# Patient Record
Sex: FEMALE | Race: WHITE | NOT HISPANIC OR LATINO | URBAN - METROPOLITAN AREA
[De-identification: names, ages, dates, MRNs, and addresses within clinical notes are randomized per-mention and may not be internally consistent; named-entity substitution may affect disease eponyms.]

---

## 2024-07-15 ENCOUNTER — OFFICE VISIT (OUTPATIENT)
Dept: OTOLARYNGOLOGY | Facility: CLINIC | Age: 64
End: 2024-07-15
Payer: MEDICAID

## 2024-07-15 VITALS — TEMPERATURE: 98 F | DIASTOLIC BLOOD PRESSURE: 78 MMHG | HEART RATE: 69 BPM | SYSTOLIC BLOOD PRESSURE: 120 MMHG

## 2024-07-15 DIAGNOSIS — H91.93 BILATERAL HEARING LOSS, UNSPECIFIED HEARING LOSS TYPE: ICD-10-CM

## 2024-07-15 DIAGNOSIS — H61.23 BILATERAL IMPACTED CERUMEN: ICD-10-CM

## 2024-07-15 DIAGNOSIS — H93.13 TINNITUS OF BOTH EARS: Primary | ICD-10-CM

## 2024-07-15 PROCEDURE — 99214 OFFICE O/P EST MOD 30 MIN: CPT | Mod: PBBFAC | Performed by: NURSE PRACTITIONER

## 2024-07-15 PROCEDURE — 69210 REMOVE IMPACTED EAR WAX UNI: CPT | Mod: 50,PBBFAC | Performed by: NURSE PRACTITIONER

## 2024-07-15 PROCEDURE — 69210 REMOVE IMPACTED EAR WAX UNI: CPT | Mod: 50,S$PBB,, | Performed by: NURSE PRACTITIONER

## 2024-07-15 PROCEDURE — 99203 OFFICE O/P NEW LOW 30 MIN: CPT | Mod: S$PBB,,, | Performed by: NURSE PRACTITIONER

## 2024-07-15 RX ORDER — DIAZEPAM 2 MG/1
2 TABLET ORAL EVERY 6 HOURS PRN
COMMUNITY

## 2024-07-15 RX ORDER — FAMOTIDINE 20 MG/1
20 TABLET, FILM COATED ORAL 2 TIMES DAILY
COMMUNITY

## 2024-07-15 RX ORDER — TOPIRAMATE 25 MG/1
25 TABLET ORAL NIGHTLY
COMMUNITY

## 2024-07-15 RX ORDER — PRAVASTATIN SODIUM 40 MG/1
40 TABLET ORAL DAILY
COMMUNITY

## 2024-07-15 RX ORDER — ALBUTEROL SULFATE 90 UG/1
2 AEROSOL, METERED RESPIRATORY (INHALATION) EVERY 6 HOURS PRN
COMMUNITY

## 2024-07-15 RX ORDER — IBUPROFEN 800 MG/1
800 TABLET ORAL EVERY 6 HOURS PRN
COMMUNITY

## 2024-07-15 RX ORDER — LEVOCETIRIZINE DIHYDROCHLORIDE 5 MG/1
5 TABLET, FILM COATED ORAL NIGHTLY
COMMUNITY

## 2024-07-15 RX ORDER — BUPROPION HYDROCHLORIDE 150 MG/1
150 TABLET ORAL DAILY
COMMUNITY

## 2024-07-15 RX ORDER — ESTRADIOL 2 MG/1
2 TABLET ORAL DAILY
COMMUNITY

## 2024-07-15 RX ORDER — QUETIAPINE FUMARATE 50 MG/1
50 TABLET, FILM COATED ORAL NIGHTLY
COMMUNITY

## 2024-07-15 RX ORDER — CETIRIZINE HYDROCHLORIDE 10 MG/1
10 TABLET ORAL DAILY
COMMUNITY

## 2024-07-15 RX ORDER — MONTELUKAST SODIUM 4 MG/1
1 TABLET, CHEWABLE ORAL DAILY
COMMUNITY

## 2024-07-15 RX ORDER — OMEPRAZOLE 20 MG/1
20 CAPSULE, DELAYED RELEASE ORAL DAILY
COMMUNITY

## 2024-07-15 NOTE — PROGRESS NOTES
[facility]  Otolaryngology Clinic Note    Sivan Ayala  YOB: 1960    Chief Complaint:   Chief Complaint   Patient presents with    referral: Tinnitus        HPI: 07/15/2024: 63 y.o. female referred for tinnitus. 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 Hx of recurrent ear infections throughout her life. She has had 2 sets of PE tubes, around age 22 and again about 3 years ago per Dr. Maki. Endorses infrequent otalgia. Denies loud noise exposure or family hx of HL. States she began having trouble hearing as a child. No hx of amplification.     ROS:   10-point review of systems negative except per HPI      Review of patient's allergies indicates:   Allergen Reactions    Codeine Nausea And Vomiting       History reviewed. No pertinent past medical history.    Past Surgical History:   Procedure Laterality Date    HYSTERECTOMY         Social History     Socioeconomic History    Marital status:    Tobacco Use    Smoking status: Never     Passive exposure: Never    Smokeless tobacco: Never   Social History Narrative    ** Merged History Encounter **            No family history on file.    Outpatient Encounter Medications as of 7/15/2024   Medication Sig Dispense Refill    albuterol (PROVENTIL/VENTOLIN HFA) 90 mcg/actuation inhaler Inhale 2 puffs into the lungs every 6 (six) hours as needed for Wheezing. Rescue      buPROPion (WELLBUTRIN XL) 150 MG TB24 tablet Take 150 mg by mouth once daily.      cetirizine (ZYRTEC) 10 MG tablet Take 10 mg by mouth once daily.      diazePAM (VALIUM) 2 MG tablet Take 2 mg by mouth every 6 (six) hours as needed for Anxiety.      estradioL (ESTRACE) 2 MG tablet Take 2 mg by mouth once daily.      famotidine (PEPCID) 20 MG tablet Take 20 mg by mouth 2 (two) times daily.      ibuprofen (ADVIL,MOTRIN) 800 MG tablet Take 800 mg by mouth every 6 (six) hours as needed for Pain.       levocetirizine (XYZAL) 5 MG tablet Take 5 mg by mouth every evening.      omeprazole (PRILOSEC) 20 MG capsule Take 20 mg by mouth once daily.      pravastatin (PRAVACHOL) 40 MG tablet Take 40 mg by mouth once daily.      QUEtiapine (SEROQUEL) 50 MG tablet Take 50 mg by mouth every evening.       No facility-administered encounter medications on file as of 7/15/2024.       Physical Exam:  Vitals:    07/15/24 1051   BP: 120/78   BP Location: Right arm   Patient Position: Sitting   BP Method: Large (Automatic)   Pulse: 69   Temp: 98.2 °F (36.8 °C)   TempSrc: Oral       Physical Exam   General: NAD, voice normal  Neuro: AAO, CN II - XII grossly intact  Head/ Face: NCAT, symmetric, sensations intact bilaterally  Eyes: EOMI, PERRL  Ears: externally normal with grossly normal hearing. Able to auto insufflate b/l. Ureña lateralizes right. No mastoid tenderness  AD: EAC with cerumen impaction- atraumatic removal under microscopy with suction- there is a small amt of squamous debris and cerumen adherent to T-tube which appears to be in place in TM- no otorrhea or retraction  AS: EAC with obstructive cerumen- atraumatic removal under microscopy with suction- there is some hard cerumen adherent to T-tube which appears to be in place in TM, possibly partially extruded- no otorrhea or retraction  Nose: bilateral nares patent, left septal deviation, no rhinorrhea, left deviation of nasal dorsum with right tip deflection, no turbinate hypertrophy  OC/OP: MMM, no intraoral lesions, no trismus, dentition is moderate, no uvular deviation, bilaterally symmetric soft palate elevation, palatoglossus and palatopharyngeal fold wnl; tonsils are symmetric and 1+  Indirect laryngoscopy: deferred due to patient intolerance  Neck: soft, supple, no LAD, normal ROM, no thyromegaly  Respiratory: nonlabored, no wheezing, bilateral chest rise  Cardiovascular: RRR  Gastrointestinal: S NT ND  Skin: warm, no lesions  Musculoskeletal: 5/5  strength  Psych: Appropriate affect/mood     Pertinent Data:  ? LABS:  ? AUDIO:           ? PATH:      Imaging:   I personally reviewed the following images:        Assessment/Plan:  63 y.o. female with hx of PE tubes, most recently 3 placed years ago. Also with lonstanding hx of L>R HL. B/l cerumen removal today.  - Debrox BID  - Audio  - RTC 6-8 wks    Lyric Vilchis NP

## 2024-10-30 ENCOUNTER — OFFICE VISIT (OUTPATIENT)
Dept: OTOLARYNGOLOGY | Facility: CLINIC | Age: 64
End: 2024-10-30
Payer: MEDICAID

## 2024-10-30 ENCOUNTER — CLINICAL SUPPORT (OUTPATIENT)
Dept: AUDIOLOGY | Facility: HOSPITAL | Age: 64
End: 2024-10-30
Payer: MEDICAID

## 2024-10-30 VITALS — SYSTOLIC BLOOD PRESSURE: 138 MMHG | HEART RATE: 67 BPM | DIASTOLIC BLOOD PRESSURE: 80 MMHG | TEMPERATURE: 98 F

## 2024-10-30 DIAGNOSIS — H90.3 SENSORINEURAL HEARING LOSS (SNHL) OF BOTH EARS: Primary | ICD-10-CM

## 2024-10-30 DIAGNOSIS — H91.93 BILATERAL HEARING LOSS, UNSPECIFIED HEARING LOSS TYPE: ICD-10-CM

## 2024-10-30 DIAGNOSIS — Z96.22 PATENT PRESSURE EQUALIZATION (PE) TUBE, RIGHT: ICD-10-CM

## 2024-10-30 DIAGNOSIS — H93.13 TINNITUS OF BOTH EARS: ICD-10-CM

## 2024-10-30 DIAGNOSIS — H90.3 SENSORINEURAL HEARING LOSS, BILATERAL: Primary | ICD-10-CM

## 2024-10-30 PROCEDURE — 92557 COMPREHENSIVE HEARING TEST: CPT | Performed by: AUDIOLOGIST

## 2024-10-30 PROCEDURE — 69200 CLEAR OUTER EAR CANAL: CPT | Mod: PBBFAC,LT | Performed by: NURSE PRACTITIONER

## 2024-10-30 PROCEDURE — 92567 TYMPANOMETRY: CPT | Performed by: AUDIOLOGIST

## 2024-10-30 PROCEDURE — 99214 OFFICE O/P EST MOD 30 MIN: CPT | Mod: PBBFAC,25 | Performed by: NURSE PRACTITIONER

## 2024-10-30 RX ORDER — OXYCODONE AND ACETAMINOPHEN 5; 325 MG/1; MG/1
1 TABLET ORAL EVERY 8 HOURS
COMMUNITY
Start: 2024-07-11

## 2024-10-30 RX ORDER — SUCRALFATE 1 G/1
1 TABLET ORAL 3 TIMES DAILY
COMMUNITY
Start: 2024-10-07

## 2024-10-30 RX ORDER — OFLOXACIN 3 MG/ML
SOLUTION AURICULAR (OTIC)
COMMUNITY
Start: 2024-08-09

## 2024-10-30 RX ORDER — LAMOTRIGINE 25 MG/1
TABLET ORAL
COMMUNITY

## 2024-10-30 RX ORDER — DEXTROAMPHETAMINE SACCHARATE, AMPHETAMINE ASPARTATE, DEXTROAMPHETAMINE SULFATE, AND AMPHETAMINE SULFATE 1.25; 1.25; 1.25; 1.25 MG/1; MG/1; MG/1; MG/1
TABLET ORAL
COMMUNITY
Start: 2024-10-09

## 2024-10-30 RX ORDER — NEOMYCIN SULFATE, POLYMYXIN B SULFATE AND HYDROCORTISONE 10; 3.5; 1 MG/ML; MG/ML; [USP'U]/ML
SUSPENSION/ DROPS AURICULAR (OTIC)
COMMUNITY
Start: 2024-08-24

## 2024-10-30 RX ORDER — HYDROXYZINE PAMOATE 25 MG/1
25 CAPSULE ORAL
COMMUNITY
Start: 2024-05-22

## 2024-11-12 ENCOUNTER — HOSPITAL ENCOUNTER (OUTPATIENT)
Dept: RADIOLOGY | Facility: HOSPITAL | Age: 64
Discharge: HOME | End: 2024-11-12
Attending: NURSE PRACTITIONER
Payer: MEDICAID

## 2024-11-12 DIAGNOSIS — H90.3 SENSORINEURAL HEARING LOSS (SNHL) OF BOTH EARS: ICD-10-CM

## 2024-11-12 PROCEDURE — 70480 CT ORBIT/EAR/FOSSA W/O DYE: CPT | Mod: TC

## 2025-01-07 NOTE — PROGRESS NOTES
Audio Only Telehealth Visit     The patient location is: [facility]  The chief complaint leading to consultation is: f/u  Visit type: Virtual visit with audio only (telephone)  Total time spent on visit: 22 min     The reason for the audio only service rather than synchronous audio and video virtual visit was related to technical difficulties or patient preference/necessity.     Each patient to whom I provide medical services by telemedicine is:  (1) informed of the relationship between the physician and patient and the respective role of any other health care provider with respect to management of the patient; and (2) notified that they may decline to receive medical services by telemedicine and may withdraw from such care at any time. Patient verbally consented to receive this service via voice-only telephone call.       HPI:  07/15/2024: 63 y.o. female referred for tinnitus. 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 Hx of recurrent ear infections throughout her life. She has had 2 sets of PE tubes, around age 22 and again about 3 years ago per Dr. Maki. Endorses infrequent otalgia. Denies loud noise exposure or family hx of HL. States she began having trouble hearing as a child. No hx of amplification.      10/30/2024: F/u after audio. States she had a bad ear infection following last visit for which she took 2 rounds of oral abx as well as gtts. States right ear was hurting and draining pus. No c/o today.     01/08/25: F/u after CT IAC. Denies change in symptoms.    Audio:       Imaging:          Assessment and plan:  64 y.o. female with hx of PE tubes, most recently 3 placed years ago. Audio with b/l SNHL, R>L. Loss is not >15dB across 3 consecutive frequencies, however, near asymmetric. CT temporal bones essentially unremarkable- mild b/l mastoid opacification & thickened right TM- no surgical intervention indicated. D/w Dr. Ryan.  - 6mo audio  -  Medically cleared for amplification  - Dry ear precautions  - RTC 6mo, office or telemed    Lyric Vilchis NP        This service was not originating from a related E/M service provided within the previous 7 days nor will  to an E/M service or procedure within the next 24 hours or my soonest available appointment.  Prevailing standard of care was able to be met in this audio-only visit.

## 2025-01-08 ENCOUNTER — OFFICE VISIT (OUTPATIENT)
Dept: OTOLARYNGOLOGY | Facility: CLINIC | Age: 65
End: 2025-01-08
Payer: MEDICAID

## 2025-01-08 DIAGNOSIS — H93.13 TINNITUS OF BOTH EARS: ICD-10-CM

## 2025-01-08 DIAGNOSIS — H90.3 SENSORINEURAL HEARING LOSS (SNHL) OF BOTH EARS: Primary | ICD-10-CM

## 2025-01-08 DIAGNOSIS — Z96.22 PATENT PRESSURE EQUALIZATION (PE) TUBE, RIGHT: ICD-10-CM

## 2025-01-10 ENCOUNTER — CLINICAL SUPPORT (OUTPATIENT)
Dept: AUDIOLOGY | Facility: HOSPITAL | Age: 65
End: 2025-01-10
Payer: MEDICAID

## 2025-01-10 DIAGNOSIS — H90.3 SENSORINEURAL HEARING LOSS, BILATERAL: Primary | ICD-10-CM

## 2025-01-10 PROCEDURE — 92591 HC HEARING AID EXAM, BOTH EARS: CPT | Performed by: AUDIOLOGIST

## 2025-01-10 NOTE — PROGRESS NOTES
Hearing Aid Consultation      Monaural/Binaural Binaural   Make/Model Phonak Audeo L50-R   Color black    length/size 1L/R - standard   Dome open   Accessory N/a     Hearing Aid Consultation Note: Ms. Sinclair is willing to try hearing aids due to help with speech understanding. She has constant bilateral tinnitus, therefore an open dome approach will be fit initially. In the event she still struggle with clarity, will switch out to closed domes and  heavily on possible tinnitus interference.      Recommendation: Already medically cleared by ENT so will move forward with prior auth request.    Georgia Vargas, AuD

## 2025-01-27 ENCOUNTER — LAB VISIT (OUTPATIENT)
Dept: LAB | Facility: HOSPITAL | Age: 65
End: 2025-01-27
Attending: NURSE PRACTITIONER
Payer: MEDICAID

## 2025-01-27 DIAGNOSIS — F41.9 ANXIETY DISORDER OF CHILDHOOD OR ADOLESCENCE: ICD-10-CM

## 2025-01-27 DIAGNOSIS — F33.2 SEVERE RECURRENT MAJOR DEPRESSION WITHOUT PSYCHOTIC FEATURES: Primary | ICD-10-CM

## 2025-01-27 PROCEDURE — 93005 ELECTROCARDIOGRAM TRACING: CPT

## 2025-01-27 PROCEDURE — 93010 ELECTROCARDIOGRAM REPORT: CPT | Mod: ,,, | Performed by: INTERNAL MEDICINE

## 2025-01-28 LAB
OHS QRS DURATION: 88 MS
OHS QTC CALCULATION: 419 MS

## 2025-02-10 ENCOUNTER — CLINICAL SUPPORT (OUTPATIENT)
Dept: AUDIOLOGY | Facility: HOSPITAL | Age: 65
End: 2025-02-10
Payer: MEDICAID

## 2025-02-10 DIAGNOSIS — H90.3 SENSORINEURAL HEARING LOSS, BILATERAL: Primary | ICD-10-CM

## 2025-02-10 PROCEDURE — V5140 BEHIND EAR BINAUR HEARING AI: HCPCS | Performed by: AUDIOLOGIST

## 2025-02-10 PROCEDURE — V5160 DISPENSING FEE BINAURAL: HCPCS | Performed by: AUDIOLOGIST

## 2025-02-10 NOTE — PROGRESS NOTES
Hearing Aid Fitting    Date of fittin/10/25    Make/model Phonak Audeo L50-R   Right SN [id number]   Left SN [id number]    size/length 1L/R - standard   Dome  Closed/vented (medium)    Battery rechargeable   Warranty 3/1/30   Accessory N/a     Fitting Note: Patient did well with all aspects of device. Insertion and removal achieved without difficulty. She tolerated sound quality well. Target set at 90% to auto-acclimate to 100% in 2 weeks.  She did not complain of right tinnitus therefore closed domes kept for the fitting as these are more appropriate then open. Did not run feedback test but turned on whistle block.  No further adjustments made.      Recommendation: 2 week follow up scheduled but encouraged to call if problems arise sooner.    Georgia Vargas, AuD    **Prior authorization document for DME scanned in**

## 2025-02-24 ENCOUNTER — CLINICAL SUPPORT (OUTPATIENT)
Dept: AUDIOLOGY | Facility: HOSPITAL | Age: 65
End: 2025-02-24
Payer: MEDICAID

## 2025-02-24 DIAGNOSIS — H90.3 SENSORINEURAL HEARING LOSS, BILATERAL: Primary | ICD-10-CM

## 2025-02-24 PROCEDURE — 92593 HC HEARING AID CHECK, BOTH EARS: CPT | Performed by: AUDIOLOGIST

## 2025-02-24 NOTE — PROGRESS NOTES
Hearing aid check:    Ms. Sinclair is in for her 2 week hearing aid check from initial fitting but admits that she has not worn hearing aids much (only to talk on phone and watch tv).  She did have hearing aids with her but were dead as she was out-of-town all weekend and forgot devices.  Counseled, again, on importance of full day wear time even if she is alone at home.  She verbalized understanding. We scheduled a one month follow up to determine if adjustments are warranted.    Georgia Vargas, AuD

## 2025-03-24 ENCOUNTER — CLINICAL SUPPORT (OUTPATIENT)
Dept: AUDIOLOGY | Facility: HOSPITAL | Age: 65
End: 2025-03-24
Payer: MEDICAID

## 2025-03-24 DIAGNOSIS — H90.3 SENSORINEURAL HEARING LOSS, BILATERAL: Primary | ICD-10-CM

## 2025-03-24 PROCEDURE — 92593 HC HEARING AID CHECK, BOTH EARS: CPT | Performed by: AUDIOLOGIST

## 2025-03-24 NOTE — PROGRESS NOTES
Hearing aid check:    Overall, Mrs. Sinclair is doing well with her devices. She admits to full-time utilization. Only problem reported is intermittent static coming from device but does not happen often. No adjustments to sound quality required for today's visit. Listening check indicates hearing aids are in good working condition.      Scheduled annual audio on 7/8 to include routine hearing aid check. If she continues to do well with hearing aids, can follow up in 6 months from there.    Georgia Vargas, AuD

## 2025-05-15 ENCOUNTER — HOSPITAL ENCOUNTER (EMERGENCY)
Facility: HOSPITAL | Age: 65
Discharge: HOME | End: 2025-05-15
Attending: EMERGENCY MEDICINE
Payer: MEDICAID

## 2025-05-15 VITALS
RESPIRATION RATE: 16 BRPM | HEART RATE: 91 BPM | WEIGHT: 160 LBS | DIASTOLIC BLOOD PRESSURE: 69 MMHG | SYSTOLIC BLOOD PRESSURE: 129 MMHG | OXYGEN SATURATION: 98 % | HEIGHT: 65 IN | TEMPERATURE: 98 F | BODY MASS INDEX: 26.66 KG/M2

## 2025-05-15 DIAGNOSIS — M54.50 ACUTE RIGHT-SIDED LOW BACK PAIN WITHOUT SCIATICA: Primary | ICD-10-CM

## 2025-05-15 LAB
BACTERIA #/AREA URNS AUTO: NORMAL /HPF
BILIRUB UR QL STRIP.AUTO: ABNORMAL
CLARITY UR: CLEAR
COLOR UR AUTO: YELLOW
GLUCOSE UR QL STRIP: NEGATIVE
HGB UR QL STRIP: ABNORMAL
KETONES UR QL STRIP: ABNORMAL
LEUKOCYTE ESTERASE UR QL STRIP: NEGATIVE
NITRITE UR QL STRIP: NEGATIVE
PH UR STRIP: 7 [PH]
PROT UR QL STRIP: NEGATIVE
RBC #/AREA URNS AUTO: NORMAL /HPF
SP GR UR STRIP.AUTO: 1.02 (ref 1–1.03)
SQUAMOUS #/AREA URNS AUTO: NORMAL /HPF
UA DIPSTICK W REFLEX MICRO PNL UR: NORMAL
UROBILINOGEN UR STRIP-ACNC: 2
WBC #/AREA URNS AUTO: NORMAL /HPF

## 2025-05-15 PROCEDURE — 81003 URINALYSIS AUTO W/O SCOPE: CPT | Performed by: EMERGENCY MEDICINE

## 2025-05-15 PROCEDURE — 96372 THER/PROPH/DIAG INJ SC/IM: CPT | Performed by: EMERGENCY MEDICINE

## 2025-05-15 PROCEDURE — 99285 EMERGENCY DEPT VISIT HI MDM: CPT | Mod: 25

## 2025-05-15 PROCEDURE — 63600175 PHARM REV CODE 636 W HCPCS: Mod: JZ,TB | Performed by: EMERGENCY MEDICINE

## 2025-05-15 RX ORDER — IBUPROFEN 800 MG/1
800 TABLET, FILM COATED ORAL EVERY 6 HOURS PRN
Qty: 20 TABLET | Refills: 0 | Status: SHIPPED | OUTPATIENT
Start: 2025-05-15

## 2025-05-15 RX ORDER — KETOROLAC TROMETHAMINE 30 MG/ML
30 INJECTION, SOLUTION INTRAMUSCULAR; INTRAVENOUS
Status: COMPLETED | OUTPATIENT
Start: 2025-05-15 | End: 2025-05-15

## 2025-05-15 RX ORDER — CYCLOBENZAPRINE HCL 10 MG
10 TABLET ORAL 3 TIMES DAILY PRN
Qty: 15 TABLET | Refills: 0 | Status: SHIPPED | OUTPATIENT
Start: 2025-05-15 | End: 2025-05-20

## 2025-05-15 RX ADMIN — KETOROLAC TROMETHAMINE 30 MG: 30 INJECTION, SOLUTION INTRAMUSCULAR; INTRAVENOUS at 08:05

## 2025-05-15 NOTE — Clinical Note
"Sivan Oleary" Yahir was seen and treated in our emergency department on 5/15/2025.  She may return to work on 05/16/2025.       If you have any questions or concerns, please don't hesitate to call.       LPN    "

## 2025-05-16 NOTE — ED PROVIDER NOTES
ED PROVIDER NOTE  5/15/2025    CHIEF COMPLAINT:   Chief Complaint   Patient presents with    Back Pain     C/o deshawn lower back pain x 1 hr. States h/o renal stones. Denies dysuria. Denies injury/trauma. Ibuprofen 800mg pta.       HISTORY OF PRESENT ILLNESS:   Sivan Sinclair is a 64 y.o. female who presents with chief complaint Back pain.  Onset was just prior to arrival when she states she was walking up the stairs of her boyfriend's house and had pain in her low back that has been constant, states she would not fall or injure herself.  She reports pain is aggravated with bending over.  She reports history of kidney stones in his concerned that maybe she has another kidney stone.  Denies dysuria or hematuria.    The history is provided by the patient.         REVIEW OF SYSTEMS: as noted in the HPI.  NURSING NOTES REVIEWED      PAST MEDICAL/SURGICAL HISTORY:   Past Medical History:   Diagnosis Date    Asthma     GERD (gastroesophageal reflux disease)     Hearing loss     Mental disorder     Ulcer       Past Surgical History:   Procedure Laterality Date    ADENOIDECTOMY       SECTION      CHOLECYSTECTOMY      HYSTERECTOMY      KNEE SURGERY      SINUS SURGERY      TONSILLECTOMY      TYMPANOSTOMY TUBE PLACEMENT         FAMILY HISTORY:   Family History   Problem Relation Name Age of Onset    Cancer Maternal Grandmother Elaine         Breast cancer, mastectomy    Diabetes Maternal Grandmother Elaine         Diabetic    Stroke Maternal Grandmother Elaine         Stroke    Asthma Maternal Aunt Harleen         Asthmatic       SOCIAL HISTORY: Social History[1]    ALLERGIES:   Review of patient's allergies indicates:   Allergen Reactions    Codeine Nausea And Vomiting    Doxycycline Nausea And Vomiting       PHYSICAL EXAM:  Initial Vitals [05/15/25 2025]   BP Pulse Resp Temp SpO2   (!) 143/84 98 18 97.9 °F (36.6 °C) 96 %      MAP       --         Physical Exam    Nursing note and vitals  reviewed.  Constitutional: She appears well-developed and well-nourished.   HENT:   Head: Normocephalic and atraumatic. Mouth/Throat: Uvula is midline and mucous membranes are normal.   Eyes: EOM are normal. Pupils are equal, round, and reactive to light.   Neck: Trachea normal. Neck supple.   Cardiovascular:  Normal rate, regular rhythm and normal pulses.           Pulmonary/Chest: Effort normal and breath sounds normal.   Abdominal: Abdomen is soft. Bowel sounds are normal. There is no abdominal tenderness.   There is right CVA tenderness.  No left CVA tenderness. There is no rebound and no guarding.   Musculoskeletal:      Cervical back: Neck supple.      Thoracic back: No bony tenderness.      Lumbar back: Tenderness present. No bony tenderness.        Back:      Neurological: She is alert and oriented to person, place, and time. GCS eye subscore is 4. GCS verbal subscore is 5. GCS motor subscore is 6.   Skin: Skin is warm and dry.   Psychiatric: She has a normal mood and affect. Her speech is normal. Thought content normal.         RESULTS:  Labs Reviewed   URINALYSIS, REFLEX TO URINE CULTURE - Abnormal       Result Value    Color, UA Yellow      Appearance, UA Clear      Specific Gravity, UA 1.020      pH, UA 7.0      Protein, UA Negative      Glucose, UA Negative      Ketones, UA Trace (*)     Blood, UA Trace-Intact (*)     Bilirubin, UA 1+ (*)     Urobilinogen, UA 2.0 (*)     Nitrites, UA Negative      Leukocyte Esterase, UA Negative     URINALYSIS, MICROSCOPIC    Bacteria, UA None Seen      UA Additional Findings Rare starch granules      RBC, UA 0-2      WBC, UA 0-2      Squamous Epithelial Cells, UA None Seen       Imaging Results              CT Renal Stone Study ABD Pelvis WO (Preliminary result)  Result time 05/15/25 21:11:01      Preliminary result by Joe Allen MD (05/15/25 21:11:01)                   Narrative:    START OF REPORT:  Technique: CT of the abdomen and pelvis was performed with  axial images as well as sagittal and coronal reconstruction images without intravenous contrast renal stone protocol.    Comparison: None available.    Clinical History: Bilateral flank pain.    Dosage Information: Automated Exposure Control was utilized.    Findings:  Lines and Tubes: None.  Thorax:  Lungs: There is mild nonspecific dependent change at the lung bases. No focal infiltrate or consolidation is seen. There are several subcentimeter noncalcified nodules in the imaged left lower lobe and lingula. These could be infectious or inflammatory. Correlate with clinical and laboratory parameters.  Pleura: No effusions or thickening are seen. No pneumothorax is seen in the visualized lung bases.  Heart: The heart size is within normal limits.  Abdomen:  Abdominal Wall: No abdominal wall pathology is seen.  Liver: The liver appears unremarkable.  Biliary System: No intrahepatic or extrahepatic biliary duct dilatation is seen.  Gallbladder: Surgical clips are seen in the gallbladder fossa consistent with prior cholecystectomy correlate with surgical history and visual inspection.  Pancreas: The pancreas appears unremarkable.  Spleen: The spleen appears unremarkable.  Adrenals: The adrenal glands appear unremarkable.  Kidneys: Multiple nonobstructive intrarenal collecting system kidney stones are seen in the left kidney. The largest stone measures 4.6 mm is on Image 60, Series 2 in the lower pole of the left kidney. A single cyst measuring 1.6 cm is seen on Image 54, Series 2 in the mid pole of the left kidney. The left kidney otherwise appears unremarkable with no masses or hydronephrosis identified. A single cyst measuring 2.0 cm is seen on Image 64, Series 2 in the mid pole of the right kidney. The right kidney otherwise appears unremarkable with no stones masses or hydronephrosis identified.  Aorta: The abdominal aorta appears unremarkable.  IVC: Unremarkable.  Bowel:  Esophagus: The visualized distal esophagus  appears unremarkable.  Stomach: The stomach appears unremarkable.  Duodenum: Unremarkable appearing duodenum.  Small Bowel: The small bowel appears unremarkable.  Colon: Nondistended. A few diverticula are seen in the distal descending and sigmoid colon. No associated inflammatory stranding or pericolonic fluid is seen to suggest diverticulitis. Correlate with clinical and laboratory findings.  Appendix: The appendix appears unremarkable.  Peritoneum: No intraperitoneal free air or ascites is seen.    Pelvis: Multiple small pelvic calcifications are seen.  Bladder: The bladder is nondistended and cannot be definitively evaluated.  Female:  Uterus: The uterus is not identified.  Ovaries: No adnexal masses are seen.    Bony structures:  Dorsal Spine: There is mild spondylosis of the visualized dorsal spine.  Bony Pelvis: The visualized bony structures of the pelvis appear unremarkable.      Impression:  1. No acute intraabdominal or pelvic pathology is identified. Details and other findings as discussed above.                                        PROCEDURES:  Procedures    ECG:       ED COURSE AND MEDICAL DECISION MAKING:  Medications   ketorolac injection 30 mg (30 mg Intramuscular Given 5/15/25 2033)     ED Course as of 05/15/25 2112   Thu May 15, 2025   2043 NITRITE UA: Negative [IB]   2043 Leukocyte Esterase, UA: Negative [IB]   2043 Blood, UA(!): Trace-Intact [IB]   2058 Bacteria, UA: None Seen [IB]   2058 RBC, UA: 0-2 [IB]   2058 WBC, UA: 0-2 [IB]   2058 Squam Epithel, UA: None Seen [IB]      ED Course User Index  [IB] Filippo Diamond, DO        Medical Decision Making  I estimate there is LOW risk for ABDOMINAL AORTIC ANEURYSM, CAUDA EQUINA SYNDROME, EPIDURAL MASS LESION, SPINAL STENOSIS, OR HERNIATED DISK CAUSING SEVERE STENOSIS, thus I consider the discharge disposition reasonable. We have discussed the diagnosis and risks, and we agree with discharging home to follow-up with their primary doctor. We also  discussed returning to the Emergency Department immediately if new or worsening symptoms occur. We have discussed the symptoms which are most concerning (e.g., saddle anesthesia, urinary or bowel incontinence or retention, changing or worsening pain) that necessitate immediate return.  Given strict ED return precautions. I have spoken with the patient and/or caregivers. I have explained the patient's condition, diagnoses and treatment plan based on the information available to me at this time. I have answered the patient's and/or caregiver's questions and addressed any concerns. The patient and/or caregivers have as good an understanding of the patient's diagnosis, condition and treatment plan as can be expected at this point. The vital signs have been stable. The patient's condition is stable and appropriate for discharge from the emergency department.     The patient will pursue further outpatient evaluation with the primary care physician or other designated or consulting physician as outlined in the discharge instructions. The patient and/or caregivers are agreeable to this plan of care and follow-up instructions have been explained in detail. The patient and/or caregivers have received these instructions in written format and have expressed an understanding of the discharge instructions. The patient and/or caregivers are aware that any significant change in condition or worsening of symptoms should prompt an immediate return to this or the closest emergency department or a call to 911.    Amount and/or Complexity of Data Reviewed  Labs: ordered. Decision-making details documented in ED Course.  Radiology: ordered.    Risk  OTC drugs.  Prescription drug management.        CLINICAL IMPRESSION:  1. Acute right-sided low back pain without sciatica        DISPOSITION:   ED Disposition Condition    Discharge Stable            ED Prescriptions       Medication Sig Dispense Start Date End Date Auth. Provider     ibuprofen (ADVIL,MOTRIN) 800 MG tablet Take 1 tablet (800 mg total) by mouth every 6 (six) hours as needed for Pain. 20 tablet 5/15/2025 -- Filippo Diamond DO    cyclobenzaprine (FLEXERIL) 10 MG tablet Take 1 tablet (10 mg total) by mouth 3 (three) times daily as needed for Muscle spasms. 15 tablet 5/15/2025 5/20/2025 Filippo Diamond DO          Follow-up Information       Follow up With Specialties Details Why Contact Info    Cindy Mcdowell APRN Family Medicine Schedule an appointment as soon as possible for a visit   [address] [facility] [address]  [phone]      [facility] - Emergency Dept Emergency Medicine  If symptoms worsen [address] [facility] [id number] [phone]                 [1]   Social History  Tobacco Use    Smoking status: Never     Passive exposure: Never    Smokeless tobacco: Never   Substance Use Topics    Alcohol use: Not Currently     Alcohol/week: 1.0 standard drink of alcohol     Types: 1 Glasses of wine per week     Comment: Occasion    Drug use: Never        Filippo Diamond DO  05/15/25 2112

## 2025-07-08 ENCOUNTER — CLINICAL SUPPORT (OUTPATIENT)
Dept: AUDIOLOGY | Facility: HOSPITAL | Age: 65
End: 2025-07-08
Payer: MEDICAID

## 2025-07-08 DIAGNOSIS — H90.3 SENSORINEURAL HEARING LOSS, BILATERAL: Primary | ICD-10-CM

## 2025-07-08 DIAGNOSIS — H90.3 SENSORINEURAL HEARING LOSS (SNHL) OF BOTH EARS: ICD-10-CM

## 2025-07-08 PROCEDURE — 92567 TYMPANOMETRY: CPT | Performed by: AUDIOLOGIST

## 2025-07-08 PROCEDURE — 92553 AUDIOMETRY AIR & BONE: CPT | Mod: 52 | Performed by: AUDIOLOGIST

## 2025-07-08 PROCEDURE — 92593 HC HEARING AID CHECK, BOTH EARS: CPT | Performed by: AUDIOLOGIST

## 2025-07-08 PROCEDURE — 92557 COMPREHENSIVE HEARING TEST: CPT | Performed by: AUDIOLOGIST

## 2025-07-08 PROCEDURE — 92555 SPEECH THRESHOLD AUDIOMETRY: CPT | Mod: 52 | Performed by: AUDIOLOGIST

## 2025-07-08 NOTE — PROGRESS NOTES
Hearing Evaluation        Patient History: Ms. Sinclair has a long-standing hearing loss reporting no changes in hearing since previous evaluation. She also reports constant bilateral tinnitus. Vertigo and middle ear issues are denied. S/P T-tube, right ear. All additional history is unremarkable.        Test Results:                    Pure Tone Testing:      Right ear:       Mild to moderately severe sensorineural hearing loss from 250-8kHz. Speech reception threshold is in agreement with puretone findings.  Discrimination score of 100% is considered excellent.        Left ear:          Mild to moderate sensorineural hearing loss from 250-8kHz. Speech reception threshold is in agreement with puretone findings.  Discrimination score of 100% is considered excellent.                                                                            Tympanometry:                                           Right ear:       Type 'B' tymp, large (2.75mL) volume     Left ear:          Type 'A' tymp, normal middle ear pressure/function                                  Interpretations:      Behavioral test findings indicate no significant changes in hearing since previous hearing evaluation. Speech reception thresholds obtained at 40dB, AD and 30dB, AS, and are in agreement with puretone findings. Speech discrimination scores of 100%, AU, are considered excellent.  Immittance measures indicate patent T -tube, AD and normal middle ear pressure/function, AS.     Hearing aid check:    Overall, doing well with hearing aids. No issues with sound quality reported. Functional gain in soundfield indicates she is receiving acceptable gain from devices.             Recommendations:   6 month hearing aid check (scheduled on 1/8/26)  Continue with ENT follow up  Annual hearing evaluations

## 2025-07-10 NOTE — PROGRESS NOTES
Audio Only Telehealth Visit     The patient location is: [facility]  The chief complaint leading to consultation is: f/u  Visit type: Virtual visit with audio only (telephone)  Total time spent on medical discussion: 11min  Total time spent on visit: 13 min     The reason for the audio only service rather than synchronous audio and video virtual visit was related to technical difficulties or patient preference/necessity.     Each patient to whom I provide medical services by telemedicine is:  (1) informed of the relationship between the physician and patient and the respective role of any other health care provider with respect to management of the patient; and (2) notified that they may decline to receive medical services by telemedicine and may withdraw from such care at any time. Patient verbally consented to receive this service via voice-only telephone call.       HPI:  07/15/2024: 63 y.o. female referred for tinnitus. 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 Hx of recurrent ear infections throughout her life. She has had 2 sets of PE tubes, around age 22 and again about 3 years ago per Dr. Maki. Endorses infrequent otalgia. Denies loud noise exposure or family hx of HL. States she began having trouble hearing as a child. No hx of amplification.      10/30/2024: F/u after audio. States she had a bad ear infection following last visit for which she took 2 rounds of oral abx as well as gtts. States right ear was hurting and draining pus. No c/o today.     01/08/25: F/u after CT IAC. Denies change in symptoms.    07/11/2025: Received hearing aids & has been doing well with them. States charge does not last long, though. Denies c/o otherwise.         Audio:       Imaging:          Assessment and plan:  64 y.o. female with hx of PE tubes, most recently 3 placed years ago. Audio with b/l SNHL, R>L. Loss is not >15dB across 3 consecutive frequencies, however,  near asymmetric. CT temporal bones with mild b/l mastoid opacification & thickened right TM- no surgical intervention indicated. Audio stable- reviewed.  - Annual audio  - Dry ear precautions  - RTC 6-12mo    Lyric Vilchis NP        This service was not originating from a related E/M service provided within the previous 7 days nor will  to an E/M service or procedure within the next 24 hours or my soonest available appointment.  Prevailing standard of care was able to be met in this audio-only visit.

## 2025-07-11 ENCOUNTER — OFFICE VISIT (OUTPATIENT)
Dept: OTOLARYNGOLOGY | Facility: CLINIC | Age: 65
End: 2025-07-11
Payer: MEDICAID

## 2025-07-11 DIAGNOSIS — H90.3 SENSORINEURAL HEARING LOSS (SNHL) OF BOTH EARS: Primary | ICD-10-CM

## 2025-07-11 DIAGNOSIS — Z96.22 PATENT PRESSURE EQUALIZATION (PE) TUBE, RIGHT: ICD-10-CM

## 2025-07-11 DIAGNOSIS — H93.13 TINNITUS OF BOTH EARS: ICD-10-CM

## 2025-07-11 RX ORDER — ALPRAZOLAM 1 MG/1
1 TABLET, EXTENDED RELEASE ORAL EVERY MORNING
COMMUNITY
Start: 2025-05-08

## 2025-07-11 RX ORDER — TIZANIDINE 2 MG/1
2 TABLET ORAL NIGHTLY
COMMUNITY
Start: 2025-04-09

## 2025-07-11 RX ORDER — BUPROPION HYDROCHLORIDE 150 MG/1
TABLET ORAL
COMMUNITY